# Patient Record
Sex: MALE | Race: WHITE | NOT HISPANIC OR LATINO
[De-identification: names, ages, dates, MRNs, and addresses within clinical notes are randomized per-mention and may not be internally consistent; named-entity substitution may affect disease eponyms.]

---

## 2020-06-16 ENCOUNTER — APPOINTMENT (OUTPATIENT)
Dept: VASCULAR SURGERY | Facility: HOSPITAL | Age: 60
End: 2020-06-16

## 2020-06-16 PROBLEM — Z00.00 ENCOUNTER FOR PREVENTIVE HEALTH EXAMINATION: Status: ACTIVE | Noted: 2020-06-16

## 2020-06-17 RX ORDER — APIXABAN 5 MG/1
5 TABLET, FILM COATED ORAL TWICE DAILY
Qty: 60 | Refills: 3 | Status: ACTIVE | COMMUNITY
Start: 2020-06-17 | End: 1900-01-01

## 2020-06-25 ENCOUNTER — APPOINTMENT (OUTPATIENT)
Dept: VASCULAR SURGERY | Facility: CLINIC | Age: 60
End: 2020-06-25
Payer: COMMERCIAL

## 2020-06-25 DIAGNOSIS — I82.629 ACUTE EMBOLISM AND THROMBOSIS OF DEEP VEINS OF UNSPECIFIED UPPER EXTREMITY: ICD-10-CM

## 2020-06-25 DIAGNOSIS — G54.0 BRACHIAL PLEXUS DISORDERS: ICD-10-CM

## 2020-06-25 PROCEDURE — 99204 OFFICE O/P NEW MOD 45 MIN: CPT | Mod: 95

## 2020-07-13 NOTE — ASSESSMENT
[FreeTextEntry1] : 61 y/o M w/ vTOS s/p LUE venogram and pharmacomechanical thrombectomy of L axillary and subclavian vein on 6/16/20 by Dr Ross. 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 First rib at this time is not recommended from my stand-point given risks of rethrombosis despite rib resection, possibly requiring further thrombectomy or even stent placement; also, his age and active lifestyle. He should continue Eliquis for at least 6 months. He is encourage to call with any further questions or concerns. He may f/u prn.

## 2020-07-13 NOTE — REASON FOR VISIT
[Initial Eval - Existing Diagnosis] : an initial evaluation of an existing diagnosis [FreeTextEntry1] : TELEHEALTH

## 2020-07-13 NOTE — DATA REVIEWED
[FreeTextEntry1] : Op reports, angiography images, duplex, CT Scan and consultation notes available for review

## 2020-07-13 NOTE — HISTORY OF PRESENT ILLNESS
[FreeTextEntry1] : Verbal consent for telephonic services was given on 2020 by the patient, KAYLIE YIP. Visit provided using real-time TELEHEALTH services with two-way video and audio platform. \par \par Patient Location: \par - Home\par \par Provider Location: \par - [facility] - Vascular Surgery [address] [facility] [address]\par \par Participants: \par - Dr Marco A Moore\par - SERGIO Melton\par - Patient: Dr Carrillo\par \par I have spent 25 minutes with the patient on the telephone.\par \par 61 y/o M w/ hx of vTOS, no other significant PMH. Telehealth appt arranged for further consultation regarding the condition and his case. Pt originally presents to [facility] on 20 with LUE swelling x5 days and was evaluated by Dr Ross. 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 He then underwent LUE venogram and pharmacomechanical thrombectomy of L axillary and subclavian vein on 20. Since then, he has been on Eliquis 5mg BID and options on 1st rib resection has been discussed with a couple of other physicians as part of treatment. Dr Yip denies any current swelling, arm pain, wounds/ulcers or any personal/family history of hypercoagulable conditions. One week prior to his ED presentation, he was using his left arm (non dominant) repetitively doing yard work given eh was also suffering from bursitis on the right arm. \par \par \par PSHx: hernia repair, appendectomy\par SHx: nonsmoker, active working physician

## 2020-07-13 NOTE — PHYSICAL EXAM
[Respiratory Effort] : normal respiratory effort [Alert] : alert [No Rash or Lesion] : No rash or lesion [Oriented to Person] : oriented to person [Oriented to Place] : oriented to place [Oriented to Time] : oriented to time [Calm] : calm [FreeTextEntry1] : Via video-telehealth, no apparent LUE swelling  [de-identified] : friendly, talkative [de-identified] : FROM